# Patient Record
Sex: MALE | Race: WHITE | Employment: FULL TIME | URBAN - METROPOLITAN AREA
[De-identification: names, ages, dates, MRNs, and addresses within clinical notes are randomized per-mention and may not be internally consistent; named-entity substitution may affect disease eponyms.]

---

## 2022-02-14 ENCOUNTER — HOSPITAL ENCOUNTER (EMERGENCY)
Facility: HOSPITAL | Age: 57
Discharge: HOME | End: 2022-02-14
Attending: EMERGENCY MEDICINE | Admitting: EMERGENCY MEDICINE
Payer: COMMERCIAL

## 2022-02-14 VITALS
OXYGEN SATURATION: 96 % | WEIGHT: 190 LBS | SYSTOLIC BLOOD PRESSURE: 126 MMHG | HEART RATE: 112 BPM | DIASTOLIC BLOOD PRESSURE: 74 MMHG | TEMPERATURE: 96.8 F | RESPIRATION RATE: 21 BRPM

## 2022-02-14 DIAGNOSIS — R55 SYNCOPE, UNSPECIFIED SYNCOPE TYPE: ICD-10-CM

## 2022-02-14 DIAGNOSIS — I95.1 ORTHOSTATIC HYPOTENSION: ICD-10-CM

## 2022-02-14 DIAGNOSIS — T50.905A ADVERSE EFFECT OF DRUG, INITIAL ENCOUNTER: ICD-10-CM

## 2022-02-14 PROBLEM — F43.29 ADJUSTMENT DISORDER WITH MIXED EMOTIONAL FEATURES: Status: ACTIVE | Noted: 2022-02-14

## 2022-02-14 PROBLEM — E78.5 HYPERLIPIDEMIA: Status: ACTIVE | Noted: 2022-02-14

## 2022-02-14 PROBLEM — F41.1 ANXIETY STATE: Status: ACTIVE | Noted: 2022-02-14

## 2022-02-14 PROBLEM — I10 HYPERTENSION: Status: ACTIVE | Noted: 2022-02-14

## 2022-02-14 PROBLEM — F32.9 MAJOR DEPRESSIVE DISORDER: Status: ACTIVE | Noted: 2022-02-14

## 2022-02-14 PROBLEM — F19.10 SUBSTANCE ABUSE (H): Status: ACTIVE | Noted: 2022-02-14

## 2022-02-14 PROBLEM — M77.10 LATERAL EPICONDYLITIS OF ELBOW: Status: ACTIVE | Noted: 2022-02-14

## 2022-02-14 LAB
ANION GAP SERPL CALCULATED.3IONS-SCNC: 15 MMOL/L (ref 5–18)
BASOPHILS # BLD AUTO: 0 10E3/UL (ref 0–0.2)
BASOPHILS NFR BLD AUTO: 0 %
BUN SERPL-MCNC: 17 MG/DL (ref 8–22)
CALCIUM SERPL-MCNC: 8.3 MG/DL (ref 8.5–10.5)
CHLORIDE BLD-SCNC: 106 MMOL/L (ref 98–107)
CO2 SERPL-SCNC: 19 MMOL/L (ref 22–31)
CREAT SERPL-MCNC: 1.01 MG/DL (ref 0.7–1.3)
EOSINOPHIL # BLD AUTO: 0 10E3/UL (ref 0–0.7)
EOSINOPHIL NFR BLD AUTO: 1 %
ERYTHROCYTE [DISTWIDTH] IN BLOOD BY AUTOMATED COUNT: 11.7 % (ref 10–15)
GFR SERPL CREATININE-BSD FRML MDRD: 88 ML/MIN/1.73M2
GLUCOSE BLD-MCNC: 171 MG/DL (ref 70–125)
HCT VFR BLD AUTO: 43.2 % (ref 40–53)
HGB BLD-MCNC: 15.2 G/DL (ref 13.3–17.7)
IMM GRANULOCYTES # BLD: 0 10E3/UL
IMM GRANULOCYTES NFR BLD: 0 %
LYMPHOCYTES # BLD AUTO: 0.8 10E3/UL (ref 0.8–5.3)
LYMPHOCYTES NFR BLD AUTO: 22 %
MCH RBC QN AUTO: 32.5 PG (ref 26.5–33)
MCHC RBC AUTO-ENTMCNC: 35.2 G/DL (ref 31.5–36.5)
MCV RBC AUTO: 92 FL (ref 78–100)
MONOCYTES # BLD AUTO: 0.3 10E3/UL (ref 0–1.3)
MONOCYTES NFR BLD AUTO: 9 %
NEUTROPHILS # BLD AUTO: 2.4 10E3/UL (ref 1.6–8.3)
NEUTROPHILS NFR BLD AUTO: 68 %
NRBC # BLD AUTO: 0 10E3/UL
NRBC BLD AUTO-RTO: 0 /100
PLATELET # BLD AUTO: 156 10E3/UL (ref 150–450)
POTASSIUM BLD-SCNC: 3.5 MMOL/L (ref 3.5–5)
RBC # BLD AUTO: 4.68 10E6/UL (ref 4.4–5.9)
SODIUM SERPL-SCNC: 140 MMOL/L (ref 136–145)
TROPONIN I SERPL-MCNC: <0.01 NG/ML (ref 0–0.29)
WBC # BLD AUTO: 3.6 10E3/UL (ref 4–11)

## 2022-02-14 PROCEDURE — 99285 EMERGENCY DEPT VISIT HI MDM: CPT | Mod: 25

## 2022-02-14 PROCEDURE — 85004 AUTOMATED DIFF WBC COUNT: CPT | Performed by: EMERGENCY MEDICINE

## 2022-02-14 PROCEDURE — 93005 ELECTROCARDIOGRAM TRACING: CPT | Mod: 76 | Performed by: EMERGENCY MEDICINE

## 2022-02-14 PROCEDURE — 258N000003 HC RX IP 258 OP 636: Performed by: EMERGENCY MEDICINE

## 2022-02-14 PROCEDURE — 36415 COLL VENOUS BLD VENIPUNCTURE: CPT | Performed by: EMERGENCY MEDICINE

## 2022-02-14 PROCEDURE — 96360 HYDRATION IV INFUSION INIT: CPT

## 2022-02-14 PROCEDURE — 99284 EMERGENCY DEPT VISIT MOD MDM: CPT

## 2022-02-14 PROCEDURE — 84484 ASSAY OF TROPONIN QUANT: CPT | Performed by: EMERGENCY MEDICINE

## 2022-02-14 PROCEDURE — 80048 BASIC METABOLIC PNL TOTAL CA: CPT | Performed by: EMERGENCY MEDICINE

## 2022-02-14 RX ORDER — SODIUM CHLORIDE 9 MG/ML
INJECTION, SOLUTION INTRAVENOUS CONTINUOUS
Status: DISCONTINUED | OUTPATIENT
Start: 2022-02-14 | End: 2022-02-14 | Stop reason: HOSPADM

## 2022-02-14 RX ADMIN — SODIUM CHLORIDE 1000 ML: 9 INJECTION, SOLUTION INTRAVENOUS at 01:26

## 2022-02-14 ASSESSMENT — ENCOUNTER SYMPTOMS
CHEST TIGHTNESS: 0
NAUSEA: 1
NUMBNESS: 0
SHORTNESS OF BREATH: 0
APPETITE CHANGE: 1
DIZZINESS: 1
VOMITING: 0
WEAKNESS: 0

## 2022-02-14 NOTE — ED TRIAGE NOTES
Pt took bp med tonight and later had syncopal episode x 2.  EMS called, BP 80/.  Told to come in.  Pt states he was feeling fine prior to syncope

## 2022-02-14 NOTE — ED PROVIDER NOTES
EMERGENCY DEPARTMENT ENCOUNTER      NAME: Julio Rodriguez  AGE: 56 year old male  YOB: 1965  MRN: [id number]  EVALUATION DATE & TIME: 2/14/2022  1:08 AM    PCP: No primary care provider on file.    ED PROVIDER: Neto Killian M.D.      Chief Complaint   Patient presents with     Syncope     Hypotension         FINAL IMPRESSION:  1. Syncope, unspecified syncope type    2. Orthostatic hypotension    3. Adverse effect of drug, initial encounter          ED COURSE & MEDICAL DECISION MAKING:    Pertinent Labs & Imaging studies reviewed. (See chart for details)  56 year old male presents to the Emergency Department for evaluation of lightheadedness and hypotension.  I suspect this was due to the multiple medications he took including Viagra and prazosin.  He also was drinking.  Given IV fluids.  Blood pressure improved.  Tachycardia improved.  EKG is normal here.  No signs of arrhythmia.  Do not think this is ACS.  Kidney functions normal.  Patient feeling better.  Able ambulate without lightheadedness.  Will discharge home.  Follow-up with primary.  He will be more careful when he takes his medications.    1:12 AM I met with the patient to gather history and to perform my initial exam. I discussed the plan for care while in the Emergency Department. PPE: Provider wore gloves, goggles, and surgical mask.     At the conclusion of the encounter I discussed the results of all of the tests and the disposition. The questions were answered. The patient or family acknowledged understanding and was agreeable with the care plan.           MEDICATIONS GIVEN IN THE EMERGENCY:  Medications   0.9% sodium chloride BOLUS (0 mLs Intravenous Stopped 2/14/22 0216)     Followed by   sodium chloride 0.9% infusion ( Intravenous Not Given 2/14/22 0216)       NEW PRESCRIPTIONS STARTED AT TODAY'S ER VISIT  There are no discharge medications for this patient.      "    =================================================================    HPI    Patient information was obtained from: patient    Use of : N/A         Julio Rodriguez is a 56 year old male with a pertinent history of substance abuse who presents to this ED brought in by girlfriend for evaluation of near-syncope and hypotension.     Patient took his Prazosin and Viagra last night around 2315, got up an hour later, became dizzy and started to fall, but caught himself. He attempted to get up again, the same thing happened, and his girlfriend called EMS, reportedly SBP was in the 80s. Notes that his girlfriend informed him that he \"went unresponsive for a second\" while she was bringing him to the ED. Adds that his Prazosin dose was increased ~2 weeks ago. Also drank ~4 alcoholic drinks last night/this morning and did not eat much yesterday. Endorses nausea but otherwise denies chest pain, shortness of breath, weakness, numbness, or any other complaints at this time.       REVIEW OF SYSTEMS   Review of Systems   Constitutional: Positive for appetite change (decreased).   Respiratory: Negative for chest tightness and shortness of breath.    Cardiovascular: Negative for chest pain.   Gastrointestinal: Positive for nausea. Negative for vomiting.   Neurological: Positive for dizziness (since resolved) and syncope. Negative for weakness and numbness.        Positive for near-syncope (x2).   All other systems reviewed and are negative.       PAST MEDICAL HISTORY:  History reviewed. No pertinent past medical history.    PAST SURGICAL HISTORY:  History reviewed. No pertinent surgical history.        CURRENT MEDICATIONS:    Current Facility-Administered Medications   Medication     sodium chloride 0.9% infusion     No current outpatient medications on file.         ALLERGIES:  Allergies   Allergen Reactions     Penicillins        FAMILY HISTORY:  History reviewed. No pertinent family history.    SOCIAL HISTORY: "   Social History     Socioeconomic History     Marital status:      Spouse name: Not on file     Number of children: Not on file     Years of education: Not on file     Highest education level: Not on file   Occupational History     Not on file   Tobacco Use     Smoking status: Not on file     Smokeless tobacco: Not on file   Substance and Sexual Activity     Alcohol use: Not on file     Drug use: Not on file     Sexual activity: Not on file   Other Topics Concern     Not on file   Social History Narrative     Not on file     Social Determinants of Health     Financial Resource Strain: Not on file   Food Insecurity: Not on file   Transportation Needs: Not on file   Physical Activity: Not on file   Stress: Not on file   Social Connections: Not on file   Intimate Partner Violence: Not on file   Housing Stability: Not on file       VITALS:  /74   Pulse 112   Temp 96.8  F (36  C) (Temporal)   Resp 21   Wt 86.2 kg (190 lb)   SpO2 96%     PHYSICAL EXAM    Physical Exam  Constitutional:       General: He is not in acute distress.     Appearance: He is not diaphoretic.   HENT:      Head: Atraumatic.   Eyes:      General: No scleral icterus.     Pupils: Pupils are equal, round, and reactive to light.   Cardiovascular:      Heart sounds: Normal heart sounds.   Pulmonary:      Effort: No respiratory distress.      Breath sounds: Normal breath sounds.   Abdominal:      General: Bowel sounds are normal.      Palpations: Abdomen is soft.      Tenderness: There is no abdominal tenderness.   Musculoskeletal:         General: No tenderness.   Skin:     General: Skin is warm.      Findings: No rash.           LAB:  All pertinent labs reviewed and interpreted.  Labs Ordered and Resulted from Time of ED Arrival to Time of ED Departure   BASIC METABOLIC PANEL - Abnormal       Result Value    Sodium 140      Potassium 3.5      Chloride 106      Carbon Dioxide (CO2) 19 (*)     Anion Gap 15      Urea Nitrogen 17       Creatinine 1.01      Calcium 8.3 (*)     Glucose 171 (*)     GFR Estimate 88     CBC WITH PLATELETS AND DIFFERENTIAL - Abnormal    WBC Count 3.6 (*)     RBC Count 4.68      Hemoglobin 15.2      Hematocrit 43.2      MCV 92      MCH 32.5      MCHC 35.2      RDW 11.7      Platelet Count 156      % Neutrophils 68      % Lymphocytes 22      % Monocytes 9      % Eosinophils 1      % Basophils 0      % Immature Granulocytes 0      NRBCs per 100 WBC 0      Absolute Neutrophils 2.4      Absolute Lymphocytes 0.8      Absolute Monocytes 0.3      Absolute Eosinophils 0.0      Absolute Basophils 0.0      Absolute Immature Granulocytes 0.0      Absolute NRBCs 0.0     TROPONIN I - Normal    Troponin I <0.01         RADIOLOGY:  Reviewed all pertinent imaging. Please see official radiology report.  No orders to display       EKG:    Performed at: 115  Impression: Normal EKG.  No previous  Normal sinus rhythm ventricular to 94.  .  QRS 82.  QTc 475    I have independently reviewed and interpreted the EKG(s) documented above.        I, Chelle Gramajo, am serving as a scribe to document services personally performed by Dr. Neto Killian, based on my observation and the provider's statements to me. I, Neto Killian MD attest that Chelle Gramajo is acting in a scribe capacity, has observed my performance of the services and has documented them in accordance with my direction.    Neto Killian M.D.  Emergency Medicine  [facility] EMERGENCY DEPARTMENT  [facility] [facility] [id number]  [phone]  Dept: [phone]     Neto Killian MD  02/14/22 0344

## 2022-02-15 LAB
ATRIAL RATE - MUSE: 94 BPM
DIASTOLIC BLOOD PRESSURE - MUSE: 74 MMHG
INTERPRETATION ECG - MUSE: NORMAL
P AXIS - MUSE: 77 DEGREES
PR INTERVAL - MUSE: 136 MS
QRS DURATION - MUSE: 82 MS
QT - MUSE: 380 MS
QTC - MUSE: 475 MS
R AXIS - MUSE: 78 DEGREES
SYSTOLIC BLOOD PRESSURE - MUSE: 126 MMHG
T AXIS - MUSE: 31 DEGREES
VENTRICULAR RATE- MUSE: 94 BPM